# Patient Record
Sex: MALE | Race: ASIAN | Employment: FULL TIME | URBAN - METROPOLITAN AREA
[De-identification: names, ages, dates, MRNs, and addresses within clinical notes are randomized per-mention and may not be internally consistent; named-entity substitution may affect disease eponyms.]

---

## 2019-05-04 ENCOUNTER — HOSPITAL ENCOUNTER (EMERGENCY)
Age: 44
Discharge: HOME | End: 2019-05-04

## 2019-05-04 VITALS
WEIGHT: 154.76 LBS | OXYGEN SATURATION: 99 % | SYSTOLIC BLOOD PRESSURE: 102 MMHG | RESPIRATION RATE: 16 BRPM | HEART RATE: 66 BPM | TEMPERATURE: 97.8 F | DIASTOLIC BLOOD PRESSURE: 65 MMHG

## 2019-05-04 DIAGNOSIS — K08.89 ODONTALGIA: ICD-10-CM

## 2019-05-04 DIAGNOSIS — K05.10 GINGIVITIS: ICD-10-CM

## 2019-05-04 DIAGNOSIS — K02.9 DENTAL CARIES: Primary | ICD-10-CM

## 2019-05-04 PROCEDURE — 6370000000 HC RX 637 (ALT 250 FOR IP): Performed by: NURSE PRACTITIONER

## 2019-05-04 PROCEDURE — 99282 EMERGENCY DEPT VISIT SF MDM: CPT

## 2019-05-04 RX ORDER — CHLORHEXIDINE GLUCONATE 0.12 MG/ML
15 RINSE ORAL 2 TIMES DAILY
Qty: 420 ML | Refills: 0 | Status: SHIPPED | OUTPATIENT
Start: 2019-05-04 | End: 2019-05-18

## 2019-05-04 RX ORDER — IBUPROFEN 600 MG/1
600 TABLET ORAL EVERY 6 HOURS PRN
Qty: 30 TABLET | Refills: 0 | Status: SHIPPED | OUTPATIENT
Start: 2019-05-04

## 2019-05-04 RX ORDER — CLINDAMYCIN HYDROCHLORIDE 150 MG/1
450 CAPSULE ORAL 3 TIMES DAILY
Qty: 63 CAPSULE | Refills: 0 | Status: SHIPPED | OUTPATIENT
Start: 2019-05-04 | End: 2019-05-11

## 2019-05-04 RX ORDER — CLINDAMYCIN HYDROCHLORIDE 150 MG/1
450 CAPSULE ORAL ONCE
Status: COMPLETED | OUTPATIENT
Start: 2019-05-04 | End: 2019-05-04

## 2019-05-04 RX ORDER — NAPROXEN 250 MG/1
500 TABLET ORAL ONCE
Status: COMPLETED | OUTPATIENT
Start: 2019-05-04 | End: 2019-05-04

## 2019-05-04 RX ADMIN — CLINDAMYCIN HYDROCHLORIDE 450 MG: 150 CAPSULE ORAL at 03:41

## 2019-05-04 RX ADMIN — NAPROXEN 500 MG: 250 TABLET ORAL at 03:41

## 2019-05-04 ASSESSMENT — PAIN SCALES - GENERAL
PAINLEVEL_OUTOF10: 8

## 2019-05-04 ASSESSMENT — PAIN DESCRIPTION - PAIN TYPE
TYPE: ACUTE PAIN
TYPE: ACUTE PAIN

## 2019-05-04 ASSESSMENT — PAIN DESCRIPTION - DESCRIPTORS
DESCRIPTORS: ACHING;THROBBING
DESCRIPTORS: DISCOMFORT

## 2019-05-04 ASSESSMENT — PAIN DESCRIPTION - ORIENTATION: ORIENTATION: RIGHT

## 2019-05-04 ASSESSMENT — PAIN DESCRIPTION - LOCATION
LOCATION: TEETH
LOCATION: MOUTH

## 2019-05-04 ASSESSMENT — PAIN - FUNCTIONAL ASSESSMENT: PAIN_FUNCTIONAL_ASSESSMENT: ACTIVITIES ARE NOT PREVENTED

## 2019-05-04 ASSESSMENT — PAIN DESCRIPTION - PROGRESSION: CLINICAL_PROGRESSION: GRADUALLY WORSENING

## 2019-05-04 ASSESSMENT — PAIN DESCRIPTION - FREQUENCY: FREQUENCY: CONTINUOUS

## 2019-05-04 ASSESSMENT — PAIN DESCRIPTION - ONSET: ONSET: ON-GOING

## 2019-05-04 NOTE — ED PROVIDER NOTES
[address]  [address] [facility] [address]  Dept: [phone]  Loc: [phone]    eMERGENCY dEPARTMENT eNCOUnter   Evaluated by the Advanced Practice Provider    [address] [place] [address]    Chief Complaint   Patient presents with    Dental Pain     right side pain       HPI    Tashi Villalobos is a 38 y.o. male who presents with right lower back dental pain for 3-4 days. He denies fevers, chills, or difficulty swallowing. He does not have a dentist The duration has been constant since the onset. The quality of the pain is sharp. The pain worsens with chewing. Pt is non-English speaking person. All information obtained and patient teaching/education/discharge instructions used with Mandarin   #[id number]. REVIEW OF SYSTEMS    Respiratory: No SOB or trismus  GI: No Vomiting  General: No measured Fever    PAST MEDICAL & SURGICAL HISTORY    No past medical history on file. No past surgical history on file.     CURRENT MEDICATIONS  (may include discharge medications prescribed in the ED)  Current Outpatient Rx   Medication Sig Dispense Refill    ibuprofen (ADVIL;MOTRIN) 600 MG tablet Take 1 tablet by mouth every 6 hours as needed for Pain 30 tablet 0    chlorhexidine (PERIDEX) 0.12 % solution Take 15 mLs by mouth 2 times daily for 14 days 420 mL 0       ALLERGIES    No Known Allergies    SOCIAL & FAMILY HISTORY    Social History     Socioeconomic History    Marital status:      Spouse name: Not on file    Number of children: Not on file    Years of education: Not on file    Highest education level: Not on file   Occupational History    Not on file   Social Needs    Financial resource strain: Not on file    Food insecurity:     Worry: Not on file     Inability: Not on file    Transportation needs:     Medical: Not on file     Non-medical: Not on file   Tobacco Use    Smoking status: Never Smoker    Smokeless tobacco: display    ED COURSE & MEDICAL DECISION MAKING    See EMR for details of medications given in the ED. Medications   clindamycin (CLEOCIN) capsule 450 mg (450 mg Oral Given 5/4/19 0341)   naproxen (NAPROSYN) tablet 500 mg (500 mg Oral Given 5/4/19 0341)         Differential Diagnosis: Dental Abscess, Airway Obstruction, Salazar's Angina, Bacteremia/Sepsis. Patient is afebrile and nontoxic in appearance. Poor dental hygiene. A soft bristled toothbrush and toothpaste with mouthwash was provided to the patient. I was unable to identify any abscess that could be drained. Low index of suspicion for Salazar's or bacteremia based on exam. I instructed the patient to take the antibiotics prescribed, and to follow up as an outpatient with a dentist in 1 day. I provided the patient with a list of local dental clinics. I advised the patient to arrive at the clinic early in the morning to improve the chance of being seen. I instructed the patient to return to the ED immediately for any new or worsening symptoms. The patient verbalized understanding. I have evaluated this patient. My supervising physician was available for consultation. FINAL IMPRESSION    1. Dental caries    2. Odontalgia    3.  Gingivitis        PLAN  Oral analgesics, antibiotics, and follow up with a dentist as an outpatient    (Please note that this note was completed with a voice recognition program.  Every attempt was made to edit the dictations, but inevitably there remain words that are mis-transcribed.)       [facility], KALYAN - JOHN  05/14/19 1368